# Patient Record
Sex: FEMALE | Race: WHITE | NOT HISPANIC OR LATINO | Employment: OTHER | URBAN - METROPOLITAN AREA
[De-identification: names, ages, dates, MRNs, and addresses within clinical notes are randomized per-mention and may not be internally consistent; named-entity substitution may affect disease eponyms.]

---

## 2021-04-08 DIAGNOSIS — Z23 ENCOUNTER FOR IMMUNIZATION: ICD-10-CM

## 2022-01-24 ENCOUNTER — ANNUAL EXAM (OUTPATIENT)
Dept: OBGYN CLINIC | Facility: CLINIC | Age: 72
End: 2022-01-24
Payer: MEDICARE

## 2022-01-24 VITALS
BODY MASS INDEX: 28.53 KG/M2 | DIASTOLIC BLOOD PRESSURE: 80 MMHG | SYSTOLIC BLOOD PRESSURE: 124 MMHG | WEIGHT: 161 LBS | HEIGHT: 63 IN

## 2022-01-24 DIAGNOSIS — N83.291 OTHER OVARIAN CYST, RIGHT SIDE: ICD-10-CM

## 2022-01-24 DIAGNOSIS — Z12.31 ENCOUNTER FOR SCREENING MAMMOGRAM FOR BREAST CANCER: ICD-10-CM

## 2022-01-24 DIAGNOSIS — Z01.419 ENCOUNTER FOR GYNECOLOGICAL EXAMINATION (GENERAL) (ROUTINE) WITHOUT ABNORMAL FINDINGS: Primary | ICD-10-CM

## 2022-01-24 DIAGNOSIS — R47.89 WORD FINDING PROBLEM: ICD-10-CM

## 2022-01-24 PROCEDURE — 99213 OFFICE O/P EST LOW 20 MIN: CPT | Performed by: OBSTETRICS & GYNECOLOGY

## 2022-01-24 PROCEDURE — G0101 CA SCREEN;PELVIC/BREAST EXAM: HCPCS | Performed by: OBSTETRICS & GYNECOLOGY

## 2022-01-24 RX ORDER — LEVOTHYROXINE SODIUM 0.05 MG/1
TABLET ORAL
COMMUNITY

## 2022-01-24 RX ORDER — ATORVASTATIN CALCIUM 80 MG/1
TABLET, FILM COATED ORAL EVERY 24 HOURS
COMMUNITY

## 2022-01-24 RX ORDER — LISINOPRIL 5 MG/1
TABLET ORAL EVERY 24 HOURS
COMMUNITY

## 2022-01-24 RX ORDER — ASPIRIN 81 MG/1
81 TABLET, CHEWABLE ORAL DAILY
COMMUNITY

## 2022-01-24 RX ORDER — LEVOTHYROXINE SODIUM 0.07 MG/1
TABLET ORAL
COMMUNITY

## 2022-01-24 RX ORDER — ESCITALOPRAM OXALATE 20 MG/1
TABLET ORAL EVERY 24 HOURS
COMMUNITY

## 2022-01-24 RX ORDER — POTASSIUM CHLORIDE 20 MEQ/1
1 TABLET, EXTENDED RELEASE ORAL 2 TIMES DAILY
COMMUNITY

## 2022-01-24 RX ORDER — ALBUTEROL SULFATE 90 UG/1
POWDER, METERED RESPIRATORY (INHALATION)
COMMUNITY

## 2022-01-24 NOTE — ASSESSMENT & PLAN NOTE
Suspect pelvic findings due to loop of bowel with stool, will f/u with u/s results when obtained  Order given

## 2022-01-24 NOTE — PATIENT INSTRUCTIONS
Return to office in one year unless having any problems such as breast changes, bleeding, new persistent pain, new progressive bloating, new problems eating (getting full to quickly) or new constant urinary pressure that does not resolve in one week  Get the pelvic ultrasound to check the right side  Follow up with Dr Lizzie Matthews regarding the memory issue and see if she wants to do any testing

## 2022-01-24 NOTE — ASSESSMENT & PLAN NOTE
Doing well,  passed from multiple cancers  Has good support, lives [place] for [place] of the [place]  Some arthritis and issues with finding works  No breast or gyn complaints  Same issues with chronic diarrhea  Normal breast exam, mammo order given  Pelvic exam with right high loop of bowel  U/s to r/o ovarian issue, order given  Last pap 2014, no further indicated  Mammo order given, due  Dexa due 2024  Colonoscopy 2019, due in 3-5 years for polyp

## 2022-01-24 NOTE — PROGRESS NOTES
Assessment/Plan:    Encounter for gynecological examination (general) (routine) without abnormal findings  Doing well,  passed from multiple cancers  Has good support, lives in [place] for several months of the year  Some arthritis and issues with finding works  No breast or gyn complaints  Same issues with chronic diarrhea  Normal breast exam, mammo order given  Pelvic exam with right high loop of bowel  U/s to r/o ovarian issue, order given  Last pap 2014, no further indicated  Mammo order given, due  Dexa due 2024  Colonoscopy 2019, due in 3-5 years for polyp  Word finding problem  Recommend f/u with PCP to assess  Other ovarian cyst, right side  Suspect pelvic findings due to loop of bowel with stool, will f/u with u/s results when obtained  Order given  Diagnoses and all orders for this visit:    Encounter for gynecological examination (general) (routine) without abnormal findings    Encounter for screening mammogram for breast cancer  -     Mammo screening bilateral w 3d & cad; Future    Other ovarian cyst, right side  -     US pelvis complete w transvaginal; Future    Word finding problem    Other orders  -     atorvastatin (LIPITOR) 80 mg tablet; every 24 hours  -     Cholecalciferol 1 25 MG (26004 UT) capsule; 1 capsule  -     escitalopram (Lexapro) 20 mg tablet; every 24 hours  -     levothyroxine 50 mcg tablet; 1 tablet in the morning on an empty stomach  -     levothyroxine 75 mcg tablet; 1 tablet  -     lisinopril (ZESTRIL) 5 mg tablet; every 24 hours  -     metoprolol tartrate (LOPRESSOR) 25 mg tablet; Every 12 hours  -     potassium chloride (Klor-Con M20) 20 mEq tablet; Take 1 tablet by mouth 2 (two) times a day  -     Albuterol Sulfate (ProAir RespiClick) 177 (90 Base) MCG/ACT AEPB; 1 puff  -     aspirin 81 mg chewable tablet; Chew 81 mg daily  -     Cyanocobalamin (VITAMIN B 12 PO); Take by mouth          Subjective:      Patient ID: Ros Hunt is a 66 y o  female  Here for Medicare biannual breast and pelvic exams  The following portions of the patient's history were reviewed and updated as appropriate:   She  has a past medical history of HLD (hyperlipidemia), Hypertension, Hypothyroidism, Osteopenia, and SOB (shortness of breath)  She   Patient Active Problem List    Diagnosis Date Noted    Encounter for gynecological examination (general) (routine) without abnormal findings 01/24/2022    Word finding problem 01/24/2022    Other ovarian cyst, right side 01/24/2022     She  has a past surgical history that includes Cataract extraction (2013); CAROTID ENDARTARECTOMY (2015); Mammo (historical) (Bilateral, 01/2021); DXA procedure(historical) (01/2021); and Colonoscopy (11/2019)  Her family history includes Breast cancer in her paternal aunt; Breast cancer (age of onset: 48) in her other; Heart disease in her father and mother; Hypertension in her mother; Thyroid cancer (age of onset: 39) in her daughter  She  reports that she has quit smoking  She has never used smokeless tobacco  She reports current alcohol use of about 5 0 - 8 0 standard drinks of alcohol per week  She reports that she does not use drugs    Current Outpatient Medications   Medication Sig Dispense Refill    Albuterol Sulfate (ProAir RespiClick) 723 (90 Base) MCG/ACT AEPB 1 puff      aspirin 81 mg chewable tablet Chew 81 mg daily      atorvastatin (LIPITOR) 80 mg tablet every 24 hours      Cholecalciferol 1 25 MG (85102 UT) capsule 1 capsule      Cyanocobalamin (VITAMIN B 12 PO) Take by mouth      escitalopram (Lexapro) 20 mg tablet every 24 hours      levothyroxine 50 mcg tablet 1 tablet in the morning on an empty stomach      levothyroxine 75 mcg tablet 1 tablet      lisinopril (ZESTRIL) 5 mg tablet every 24 hours      metoprolol tartrate (LOPRESSOR) 25 mg tablet Every 12 hours      potassium chloride (Klor-Con M20) 20 mEq tablet Take 1 tablet by mouth 2 (two) times a day No current facility-administered medications for this visit  She has No Known Allergies       Review of Systems  No PMB, breast, bladder, bowel changes   No new persistent pain, bloating, early satiety or pelvic pressure  +chronic diarrhea  +difficulty finding words  +arthritis      Objective:      /80   Ht 5' 3" (1 6 m)   Wt 73 kg (161 lb)   Breastfeeding No   BMI 28 52 kg/m²          Physical Exam  General appearance: no distress, pleasant  Neck: thyroid without nodules or thyromegaly, no palpable adenopathy  Lymph nodes: no palpable adenopathy  Breasts: no masses, nodes or skin changes  Abdomen: soft, non tender, no palpable masses  Pelvic exam: normal atrophic external genitalia, urethral meatus normal, vagina atrophic without lesions, cervix atrophic without lesions, uterus small, non tender, no adnexal masses, high right loop of bowel with stool unable to displace, non tender  Rectal exam: normal sphincter tone, no masses, RV confirms above

## 2022-01-24 NOTE — LETTER
January 24, 2022     Klaudia Ruiz, [address] [facility] [id number] [facility]    Patient: Heidi Lepe   YOB: 1950   Date of Visit: 1/24/2022       Dear Dr Alexus Garcia: Thank you for referring Nacho Schneider to me for evaluation  Below are my notes for this consultation  If you have questions, please do not hesitate to call me  I look forward to following your patient along with you  Sincerely,        Koki West MD        CC: No Recipients  Koki West MD  1/24/2022 11:48 AM  Sign when Signing Visit  Assessment/Plan:    Encounter for gynecological examination (general) (routine) without abnormal findings  Doing well,  passed from multiple cancers  Has good support, lives [place] for several months of the year  Some arthritis and issues with finding works  No breast or gyn complaints  Same issues with chronic diarrhea  Normal breast exam, mammo order given  Pelvic exam with right high loop of bowel  U/s to r/o ovarian issue, order given  Last pap 2014, no further indicated  Mammo order given, due  Dexa due 2024  Colonoscopy 2019, due in 3-5 years for polyp  Word finding problem  Recommend f/u with PCP to assess  Other ovarian cyst, right side  Suspect pelvic findings due to loop of bowel with stool, will f/u with u/s results when obtained  Order given         Diagnoses and all orders for this visit:    Encounter for gynecological examination (general) (routine) without abnormal findings    Encounter for screening mammogram for breast cancer  -     Mammo screening bilateral w 3d & cad; Future    Other ovarian cyst, right side  -     US pelvis complete w transvaginal; Future    Word finding problem    Other orders  -     atorvastatin (LIPITOR) 80 mg tablet; every 24 hours  -     Cholecalciferol 1 25 MG (99767 UT) capsule; 1 capsule  -     escitalopram (Lexapro) 20 mg tablet; every 24 hours  -     levothyroxine 50 mcg tablet; 1 tablet in the morning on an empty stomach  - levothyroxine 75 mcg tablet; 1 tablet  -     lisinopril (ZESTRIL) 5 mg tablet; every 24 hours  -     metoprolol tartrate (LOPRESSOR) 25 mg tablet; Every 12 hours  -     potassium chloride (Klor-Con M20) 20 mEq tablet; Take 1 tablet by mouth 2 (two) times a day  -     Albuterol Sulfate (ProAir RespiClick) 767 (90 Base) MCG/ACT AEPB; 1 puff  -     aspirin 81 mg chewable tablet; Chew 81 mg daily  -     Cyanocobalamin (VITAMIN B 12 PO); Take by mouth          Subjective:      Patient ID: Miguel Angel Marion is a 66 y o  female  Here for Medicare biannual breast and pelvic exams  The following portions of the patient's history were reviewed and updated as appropriate:   She  has a past medical history of HLD (hyperlipidemia), Hypertension, Hypothyroidism, Osteopenia, and SOB (shortness of breath)  She   Patient Active Problem List    Diagnosis Date Noted    Encounter for gynecological examination (general) (routine) without abnormal findings 01/24/2022    Word finding problem 01/24/2022    Other ovarian cyst, right side 01/24/2022     She  has a past surgical history that includes Cataract extraction (2013); CAROTID ENDARTARECTOMY (2015); Mammo (historical) (Bilateral, 01/2021); DXA procedure(historical) (01/2021); and Colonoscopy (11/2019)  Her family history includes Breast cancer in her paternal aunt; Breast cancer (age of onset: 48) in her other; Heart disease in her father and mother; Hypertension in her mother; Thyroid cancer (age of onset: 39) in her daughter  She  reports that she has quit smoking  She has never used smokeless tobacco  She reports current alcohol use of about 5 0 - 8 0 standard drinks of alcohol per week  She reports that she does not use drugs    Current Outpatient Medications   Medication Sig Dispense Refill    Albuterol Sulfate (ProAir RespiClick) 021 (90 Base) MCG/ACT AEPB 1 puff      aspirin 81 mg chewable tablet Chew 81 mg daily      atorvastatin (LIPITOR) 80 mg tablet every 24 hours      Cholecalciferol 1 25 MG (18031 UT) capsule 1 capsule      Cyanocobalamin (VITAMIN B 12 PO) Take by mouth      escitalopram (Lexapro) 20 mg tablet every 24 hours      levothyroxine 50 mcg tablet 1 tablet in the morning on an empty stomach      levothyroxine 75 mcg tablet 1 tablet      lisinopril (ZESTRIL) 5 mg tablet every 24 hours      metoprolol tartrate (LOPRESSOR) 25 mg tablet Every 12 hours      potassium chloride (Klor-Con M20) 20 mEq tablet Take 1 tablet by mouth 2 (two) times a day       No current facility-administered medications for this visit  She has No Known Allergies       Review of Systems  No PMB, breast, bladder, bowel changes   No new persistent pain, bloating, early satiety or pelvic pressure  +chronic diarrhea  +difficulty finding words  +arthritis      Objective:      /80   Ht 5' 3" (1 6 m)   Wt 73 kg (161 lb)   Breastfeeding No   BMI 28 52 kg/m²          Physical Exam  General appearance: no distress, pleasant  Neck: thyroid without nodules or thyromegaly, no palpable adenopathy  Lymph nodes: no palpable adenopathy  Breasts: no masses, nodes or skin changes  Abdomen: soft, non tender, no palpable masses  Pelvic exam: normal atrophic external genitalia, urethral meatus normal, vagina atrophic without lesions, cervix atrophic without lesions, uterus small, non tender, no adnexal masses, high right loop of bowel with stool unable to displace, non tender  Rectal exam: normal sphincter tone, no masses, RV confirms above

## 2022-01-26 ENCOUNTER — ULTRASOUND (OUTPATIENT)
Dept: OBGYN CLINIC | Facility: CLINIC | Age: 72
End: 2022-01-26
Payer: MEDICARE

## 2022-01-26 DIAGNOSIS — D21.9 FIBROID: ICD-10-CM

## 2022-01-26 DIAGNOSIS — N83.291 OTHER OVARIAN CYST, RIGHT SIDE: ICD-10-CM

## 2022-01-26 PROCEDURE — 76856 US EXAM PELVIC COMPLETE: CPT | Performed by: OBSTETRICS & GYNECOLOGY

## 2022-01-26 PROCEDURE — 76830 TRANSVAGINAL US NON-OB: CPT | Performed by: OBSTETRICS & GYNECOLOGY

## 2022-01-26 NOTE — PROGRESS NOTES
AMB US Pelvic Non OB    Date/Time: 1/26/2022 2:22 PM  Performed by: Brett Prater  Authorized by: Enzo Lombard, MD     Procedure details:     Indications: leiomyoma      Technique:  Transvaginal US, Non-OB  Uterine findings:     Length (cm): 7 5    Height (cm):  4 9    Width (cm):  5 5    Uterine adhesions: not identified      Adnexal mass: not identified      Polyps: not identified      Myomas: identified      Endometrial stripe: identified      Endometrial hyperplasia: not identified      Endometrium thickness (mm):  4  Left ovary findings:     Left ovary:  Visualized    Cysts: not identified      Length (cm): 2 7    Height (cm): 2 1    Width (cm): 3 2  Right ovary findings:     Right ovary:  Visualized    Cysts: not identified      Length (cm): 2 4    Height (cm): 2 1    Width (cm): 2 6  Other findings:     Free pelvic fluid: not identified      Free peritoneal fluid: not identified    Post-Procedure Details:     Impression:  Endo-4mm  RT Fundal calcified Fibroid=35 x 34 x 34 mm    Tolerance:   Tolerated well, no immediate complications

## 2022-02-01 ENCOUNTER — TELEPHONE (OUTPATIENT)
Dept: OBGYN CLINIC | Facility: CLINIC | Age: 72
End: 2022-02-01

## 2022-02-01 PROBLEM — Z86.018 HISTORY OF UTERINE FIBROID: Status: ACTIVE | Noted: 2022-02-01

## 2022-02-01 NOTE — TELEPHONE ENCOUNTER
Spoke with Annette Tobar, informed of ultrasound with known, now smaller, fibroid and normal ovaries

## 2022-02-17 ENCOUNTER — VBI (OUTPATIENT)
Dept: ADMINISTRATIVE | Facility: OTHER | Age: 72
End: 2022-02-17

## 2022-02-17 NOTE — TELEPHONE ENCOUNTER
Upon review of the In Basket request we were able to locate, review, and update the patient chart as requested for DEXA Scan and Mammogram     Any additional questions or concerns should be emailed to the Practice Liaisons via [email or web address] email, please do not reply via In Basket      Thank you  Shelley Doing

## 2023-06-02 ENCOUNTER — TELEPHONE (OUTPATIENT)
Dept: OBGYN CLINIC | Facility: CLINIC | Age: 73
End: 2023-06-02

## 2023-06-02 DIAGNOSIS — Z12.31 ENCOUNTER FOR SCREENING MAMMOGRAM FOR MALIGNANT NEOPLASM OF BREAST: Primary | ICD-10-CM

## 2023-06-02 NOTE — TELEPHONE ENCOUNTER
6/2/23 patient has an upcoming breast check appointment 10/19/23 with Dr Jose Birch  Patient is asking for a updated mammogram and U/S order that pt forgot to do in 2022   Thank you

## 2023-06-02 NOTE — TELEPHONE ENCOUNTER
She just had the mammogram last year (1/2022) which showed the stable cyst    Screening mammo order placed

## 2023-06-05 NOTE — TELEPHONE ENCOUNTER
Pt  Informed of had Mammogram testing done in January 2022,showing stable cyst  Advised Dr Gustavo Lino ordering screening Mammogram order,no breast u/s orders  Yelena verbalized understanding  Will mail mammogram order to her home address

## 2023-08-03 ENCOUNTER — TELEPHONE (OUTPATIENT)
Dept: OBGYN CLINIC | Facility: CLINIC | Age: 73
End: 2023-08-03

## 2023-08-03 NOTE — TELEPHONE ENCOUNTER
Joyce Kirk of Dr. Aamir Estrada recommendations. Cammie Renner wants to see how things progress then decide,about scheduling an appt with Dr. Neela Gibbs.

## 2023-08-03 NOTE — TELEPHONE ENCOUNTER
Stoney Schulz fell today injuring left breast on a statue. She is currently in [place] until October. She was seen at a Urgent Care facility in [place] and prescribed Cephalexin 500 mg. She said the left breast feels hard, lump & warm to touch. Stoney Schulz said the area is the same spot that has been drained in the past. Stoney Schulz is going to try & upload the Urgent Care records. The Urgent Care did a CXR. Stoney Schulz was informed of a contusion of left Thoracic, Mastodyna but no abscess. Stoney Schulz was informed by Urgent Care to contact our office. Stoney Schulz doesn't want to leave [place]. Sent message to Dr. Erum Garcia.

## 2023-08-03 NOTE — TELEPHONE ENCOUNTER
I would have to defer to urgent care and emergent therapy. Until she can be seen for a breast exam, there is little I can do.

## 2023-10-04 PROBLEM — Z80.3 FAMILY HISTORY OF BREAST CANCER: Status: ACTIVE | Noted: 2023-10-04

## 2023-10-04 NOTE — PROGRESS NOTES
Assessment/Plan:    Breast lump on left side at 2 o'clock position  [facility] 8/1/23 and hit left breast. Had pain an tennis ball size mass following, now smaller, . No bruising, skin changes or nipple discharge. On exam left 4 x 4 cm cyst with small firm nodule. H/o "large cyst" on mammogram, last  1/27/22 BIRADS 2A. Diagnostic left mammo and u/s ordered. RTO 2 wks for recheck. Suspect trauma related changes, possible fat necrosis. Diagnoses and all orders for this visit:    Breast lump on left side at 2 o'clock position  -     Mammo diagnostic left w 3d & cad; Future  -     US breast left limited (diagnostic); Future    Family history of breast cancer          Subjective:      Patient ID: Shaylee Hercules is a 74 y.o. female. HPI Here for breast check after fall with mass. The following portions of the patient's history were reviewed and updated as appropriate:   She  has a past medical history of Abnormal ECG (?), Anemia (70s; 2016?), Diabetes mellitus ([address]) (2018?), Fibroid (1973), HLD (hyperlipidemia), Hypertension, Hypothyroidism, Osteopenia, SOB (shortness of breath), and Varicella. She   Patient Active Problem List    Diagnosis Date Noted   • Breast lump on left side at 2 o'clock position 10/05/2023   • Family history of breast cancer 10/04/2023   • History of uterine fibroid 02/01/2022   • Encounter for gynecological examination (general) (routine) without abnormal findings 01/24/2022   • Word finding problem 01/24/2022   • Other ovarian cyst, right side 01/24/2022     She  has a past surgical history that includes Cataract extraction (2013); CAROTID ENDARTARECTOMY (2015); Mammo (historical) (Bilateral, 01/2021); DXA procedure(historical) (01/2021); Colonoscopy (11/2019); and Gynecologic cryosurgery (1977?).   Her family history includes Breast cancer in her paternal aunt; Breast cancer (age of onset: 48) in her other; Cancer in her daughter; Heart attack in her brother, father, and mother; Heart disease in her father and mother; Hypertension in her mother; Stroke in her mother; Thyroid cancer (age of onset: 39) in her daughter; Thyroid disease in her daughter. She  reports that she quit smoking about 19 years ago. Her smoking use included cigarettes. She started smoking about 58 years ago. She has a 17.50 pack-year smoking history. She has never used smokeless tobacco. She reports current alcohol use of about 10.0 - 18.0 standard drinks of alcohol per week. She reports that she does not use drugs. Current Outpatient Medications   Medication Sig Dispense Refill   • aspirin 81 mg chewable tablet Chew 81 mg daily     • atorvastatin (LIPITOR) 80 mg tablet every 24 hours     • Cholecalciferol 1.25 MG (95575 UT) capsule 1 capsule     • Cyanocobalamin (VITAMIN B 12 PO) Take by mouth     • escitalopram (Lexapro) 20 mg tablet every 24 hours     • levothyroxine 50 mcg tablet 1 tablet in the morning on an empty stomach     • levothyroxine 75 mcg tablet 1 tablet     • lisinopril (ZESTRIL) 5 mg tablet every 24 hours     • metoprolol tartrate (LOPRESSOR) 25 mg tablet Every 12 hours     • potassium chloride (Klor-Con M20) 20 mEq tablet Take 1 tablet by mouth 2 (two) times a day       No current facility-administered medications for this visit. She has No Known Allergies. .    Review of Systems  +left breast mass, no nipple discharge or bleeding, no bruising    Objective:      /74 (BP Location: Left arm, Patient Position: Sitting, Cuff Size: Standard)   Ht 5' 3.5" (1.613 m)   Wt 73.8 kg (162 lb 9.6 oz)   BMI 28.35 kg/m²          Physical Exam    Appears well, no apparent distress. Does not appear anxious or depressed. Neck: thyroid normal size without nodules, no palpable adenopathy  Breasts: Left 2:00 4 cm cyst with superficial firm nodule 5 mm. Tender below breast over ribs.  No other masses, nodes, skin changes  Abdomen: soft, non tender, non tender liver edge

## 2023-10-05 ENCOUNTER — OFFICE VISIT (OUTPATIENT)
Dept: OBGYN CLINIC | Facility: CLINIC | Age: 73
End: 2023-10-05
Payer: MEDICARE

## 2023-10-05 VITALS
HEIGHT: 64 IN | WEIGHT: 162.6 LBS | DIASTOLIC BLOOD PRESSURE: 74 MMHG | SYSTOLIC BLOOD PRESSURE: 164 MMHG | BODY MASS INDEX: 27.76 KG/M2

## 2023-10-05 DIAGNOSIS — Z80.3 FAMILY HISTORY OF BREAST CANCER: ICD-10-CM

## 2023-10-05 DIAGNOSIS — N63.21 BREAST LUMP ON LEFT SIDE AT 2 O'CLOCK POSITION: Primary | ICD-10-CM

## 2023-10-05 PROCEDURE — 99213 OFFICE O/P EST LOW 20 MIN: CPT | Performed by: OBSTETRICS & GYNECOLOGY

## 2023-10-05 NOTE — LETTER
October 5, 2023     [facility] [address]  [address] [facility]    Patient: Remigio Harley   YOB: 1950   Date of Visit: 10/5/2023       Dear Dr. Jacqueline Keith:    Orly Charitycristi was in to see me today for evaluation. Below are my notes for this consultation. If you have questions, please do not hesitate to call me. I look forward to following your patient along with you. Sincerely,        Benito Kennedy MD        CC: No Recipients    Benito Kennedy MD  10/5/2023 11:11 AM  Sign when Signing Visit  Assessment/Plan:    Breast lump on left side at 2 o'clock position  [facility] 8/1/23 and hit left breast. Had pain an tennis ball size mass following, now smaller, . No bruising, skin changes or nipple discharge. On exam left 4 x 4 cm cyst with small firm nodule. H/o "large cyst" on mammogram, last  1/27/22 BIRADS 2A. Diagnostic left mammo and u/s ordered. RTO 2 wks for recheck. Suspect trauma related changes, possible fat necrosis. Diagnoses and all orders for this visit:    Breast lump on left side at 2 o'clock position  -     Mammo diagnostic left w 3d & cad; Future  -     US breast left limited (diagnostic); Future    Family history of breast cancer         Subjective:     Patient ID: Remigio Harley is a 74 y.o. female. HPI Here for breast check after fall with mass. The following portions of the patient's history were reviewed and updated as appropriate:   She  has a past medical history of Abnormal ECG (?), Anemia (70s; 2016?), Diabetes mellitus ([address]) (2018?), Fibroid (1973), HLD (hyperlipidemia), Hypertension, Hypothyroidism, Osteopenia, SOB (shortness of breath), and Varicella.   She   Patient Active Problem List    Diagnosis Date Noted   • Breast lump on left side at 2 o'clock position 10/05/2023   • Family history of breast cancer 10/04/2023   • History of uterine fibroid 02/01/2022   • Encounter for gynecological examination (general) (routine) without abnormal findings 01/24/2022   • Word finding problem 01/24/2022   • Other ovarian cyst, right side 01/24/2022     She  has a past surgical history that includes Cataract extraction (2013); CAROTID ENDARTARECTOMY (2015); Mammo (historical) (Bilateral, 01/2021); DXA procedure(historical) (01/2021); Colonoscopy (11/2019); and Gynecologic cryosurgery (1977?). Her family history includes Breast cancer in her paternal aunt; Breast cancer (age of onset: 48) in her other; Cancer in her daughter; Heart attack in her brother, father, and mother; Heart disease in her father and mother; Hypertension in her mother; Stroke in her mother; Thyroid cancer (age of onset: 39) in her daughter; Thyroid disease in her daughter. She  reports that she quit smoking about 19 years ago. Her smoking use included cigarettes. She started smoking about 58 years ago. She has a 17.50 pack-year smoking history. She has never used smokeless tobacco. She reports current alcohol use of about 10.0 - 18.0 standard drinks of alcohol per week. She reports that she does not use drugs. Current Outpatient Medications   Medication Sig Dispense Refill   • aspirin 81 mg chewable tablet Chew 81 mg daily     • atorvastatin (LIPITOR) 80 mg tablet every 24 hours     • Cholecalciferol 1.25 MG (23765 UT) capsule 1 capsule     • Cyanocobalamin (VITAMIN B 12 PO) Take by mouth     • escitalopram (Lexapro) 20 mg tablet every 24 hours     • levothyroxine 50 mcg tablet 1 tablet in the morning on an empty stomach     • levothyroxine 75 mcg tablet 1 tablet     • lisinopril (ZESTRIL) 5 mg tablet every 24 hours     • metoprolol tartrate (LOPRESSOR) 25 mg tablet Every 12 hours     • potassium chloride (Klor-Con M20) 20 mEq tablet Take 1 tablet by mouth 2 (two) times a day       No current facility-administered medications for this visit. She has No Known Allergies. .    Review of Systems +left breast mass, no nipple discharge or bleeding, no bruising    Objective:      /74 (BP Location: Left arm, Patient Position: Sitting, Cuff Size: Standard)   Ht 5' 3.5" (1.613 m)   Wt 73.8 kg (162 lb 9.6 oz)   BMI 28.35 kg/m²         Physical Exam   Appears well, no apparent distress. Does not appear anxious or depressed. Neck: thyroid normal size without nodules, no palpable adenopathy  Breasts: Left 2:00 4 cm cyst with superficial firm nodule 5 mm. Tender below breast over ribs.  No other masses, nodes, skin changes  Abdomen: soft, non tender, non tender liver edge

## 2023-10-05 NOTE — ASSESSMENT & PLAN NOTE
Marquise Bee 8/1/23 and hit left breast. Had pain an tennis ball size mass following, now smaller, . No bruising, skin changes or nipple discharge. On exam left 4 x 4 cm cyst with small firm nodule. H/o "large cyst" on mammogram, last  1/27/22 BIRADS 2A. Diagnostic left mammo and u/s ordered. RTO 2 wks for recheck. Suspect trauma related changes, possible fat necrosis.

## 2023-11-06 ENCOUNTER — TELEPHONE (OUTPATIENT)
Dept: OBGYN CLINIC | Facility: CLINIC | Age: 73
End: 2023-11-06

## 2023-11-06 NOTE — TELEPHONE ENCOUNTER
Patient called into office reporting that she was unable to get her breast biopsy scheduled any earlier than 11/15/23. Her office visit with Dr. Citlali Grodon is on 11/14/23. Patient was advised to keep her appt with Dr. Citlali Gordon. Patient was informed that a breast surgeon will be managing her breast biopsy results and after care and she can discuss follow up care with Dr. Citlali Gordon when she comes in for her annual visit. Patient verbalized understanding and appreciated phone call.

## 2023-11-13 NOTE — PROGRESS NOTES
Assessment/Plan:    Breast lump on left side at 2 o'clock position  Cyst getting smaller, mammo and left u/s 10/25/23 BIRADS 2B; scheduled for radiology biopsy of cyst tomorrow. Exam unchanged with firm nodule on top of 3 cm cyst.  Mammo order given for next year. History of uterine fibroid  Asymptomatic, no change in exam.   Last pap 2014, no further indicated. Diagnoses and all orders for this visit:    Breast lump on left side at 2 o'clock position    Osteopenia of left hip  -     DXA bone density spine hip and pelvis; Future    Other orders  -     gabapentin (NEURONTIN) 300 mg capsule; take 1 capsule by mouth twice a day TAPER UPWARDS AS DIRECTED          Subjective:      Patient ID: Shaylee Hercules is a 74 y.o. female. HPI Here for annual gyn and breast exam.    The following portions of the patient's history were reviewed and updated as appropriate: She  has a past medical history of Abnormal ECG (?), Anemia (70s; 2016?), Diabetes mellitus ([address]) (2018?), Fibroid (1973), HLD (hyperlipidemia), Hypertension, Hypothyroidism, Osteopenia, SOB (shortness of breath), and Varicella. She   Patient Active Problem List    Diagnosis Date Noted    Osteopenia of left hip 11/14/2023    Breast lump on left side at 2 o'clock position 10/05/2023    Family history of breast cancer 10/04/2023    History of uterine fibroid 02/01/2022    Encounter for gynecological examination (general) (routine) without abnormal findings 01/24/2022    Word finding problem 01/24/2022     She  has a past surgical history that includes Cataract extraction (2013); CAROTID ENDARTARECTOMY (2015); Mammo (historical) (Bilateral, 01/2021); DXA procedure(historical) (01/2021); Colonoscopy (11/2019); and Gynecologic cryosurgery (1977?).   Her family history includes Breast cancer in her paternal aunt; Breast cancer (age of onset: 48) in her other; Cancer in her daughter; Heart attack in her brother, father, and mother; Heart disease in her father and mother; Hypertension in her mother; Stroke in her mother; Thyroid cancer (age of onset: 39) in her daughter; Thyroid disease in her daughter. She  reports that she quit smoking about 19 years ago. Her smoking use included cigarettes. She started smoking about 58 years ago. She has a 17.50 pack-year smoking history. She has never used smokeless tobacco. She reports current alcohol use of about 10.0 - 18.0 standard drinks of alcohol per week. She reports that she does not use drugs. Current Outpatient Medications   Medication Sig Dispense Refill    aspirin 81 mg chewable tablet Chew 81 mg daily      atorvastatin (LIPITOR) 80 mg tablet every 24 hours      Cholecalciferol 1.25 MG (76875 UT) capsule 1 capsule      Cyanocobalamin (VITAMIN B 12 PO) Take by mouth      escitalopram (Lexapro) 20 mg tablet every 24 hours      gabapentin (NEURONTIN) 300 mg capsule take 1 capsule by mouth twice a day TAPER UPWARDS AS DIRECTED      levothyroxine 50 mcg tablet 1 tablet in the morning on an empty stomach      levothyroxine 75 mcg tablet 1 tablet      lisinopril (ZESTRIL) 5 mg tablet every 24 hours      metoprolol tartrate (LOPRESSOR) 25 mg tablet Every 12 hours      potassium chloride (Klor-Con M20) 20 mEq tablet Take 1 tablet by mouth 2 (two) times a day       No current facility-administered medications for this visit. She has No Known Allergies. .    Review of Systems  No PMB, breast, bladder, bowel changes.  No new persistent pain, bloating, early satiety or pelvic pressure      Objective:      /60 (BP Location: Left arm, Patient Position: Sitting, Cuff Size: Large)   Ht 5' 3.25" (1.607 m)   Wt 75.8 kg (167 lb)   BMI 29.35 kg/m²          Physical Exam    General appearance: no distress, pleasant  Neck: thyroid without nodules or thyromegaly, no palpable adenopathy  Lymph nodes: no palpable adenopathy  Breasts: Left breast with 3 x 3 cm cyst at 3:00 with small firm nodule on top of cyst. No other masses, nodes or skin changes bilaterally  Abdomen: soft, non tender, no palpable masses  Pelvic exam: normal atrophic external genitalia, urethral meatus normal, vagina atrophic without lesions, cervix atrophic without lesions, uterus small, non tender, no adnexal masses, non tender  Rectal exam: normal sphincter tone, no masses, RV confirms above

## 2023-11-14 ENCOUNTER — OFFICE VISIT (OUTPATIENT)
Dept: OBGYN CLINIC | Facility: CLINIC | Age: 73
End: 2023-11-14

## 2023-11-14 VITALS
DIASTOLIC BLOOD PRESSURE: 60 MMHG | BODY MASS INDEX: 29.59 KG/M2 | SYSTOLIC BLOOD PRESSURE: 136 MMHG | WEIGHT: 167 LBS | HEIGHT: 63 IN

## 2023-11-14 DIAGNOSIS — Z12.31 ENCOUNTER FOR SCREENING MAMMOGRAM FOR MALIGNANT NEOPLASM OF BREAST: ICD-10-CM

## 2023-11-14 DIAGNOSIS — M85.852 OSTEOPENIA OF LEFT HIP: ICD-10-CM

## 2023-11-14 DIAGNOSIS — N63.21 BREAST LUMP ON LEFT SIDE AT 2 O'CLOCK POSITION: Primary | ICD-10-CM

## 2023-11-14 PROBLEM — N83.291 OTHER OVARIAN CYST, RIGHT SIDE: Status: RESOLVED | Noted: 2022-01-24 | Resolved: 2023-11-14

## 2023-11-14 RX ORDER — GABAPENTIN 300 MG/1
CAPSULE ORAL
COMMUNITY
Start: 2023-11-08

## 2023-11-14 NOTE — LETTER
November 14, 2023     [facility] [address] [facility] [address]  [address] [facility]    Patient: Usman De Anda   YOB: 1950   Date of Visit: 11/14/2023       Dear Dr. Mekhi Quevedo:    Flor Terrell was in to see me today for evaluation. Below are my notes for this consultation. If you have questions, please do not hesitate to call me. I look forward to following your patient along with you. Sincerely,        Africa Martin MD        CC: No Recipients    Africa Martin MD  11/14/2023 10:38 AM  Sign when Signing Visit  Assessment/Plan:    Breast lump on left side at 2 o'clock position  Cyst getting smaller, mammo and left u/s 10/25/23 BIRADS 2B; scheduled for radiology biopsy of cyst tomorrow. Exam unchanged with firm nodule on top of 3 cm cyst.  Mammo order given for next year. History of uterine fibroid  Asymptomatic, no change in exam.   Last pap 2014, no further indicated. Diagnoses and all orders for this visit:    Breast lump on left side at 2 o'clock position    Osteopenia of left hip  -     DXA bone density spine hip and pelvis; Future    Other orders  -     gabapentin (NEURONTIN) 300 mg capsule; take 1 capsule by mouth twice a day TAPER UPWARDS AS DIRECTED          Subjective:      Patient ID: Usman De Anda is a 74 y.o. female. HPI Here for annual gyn and breast exam.    The following portions of the patient's history were reviewed and updated as appropriate: She  has a past medical history of Abnormal ECG (?), Anemia (70s; 2016?), Diabetes mellitus ([address]) (2018?), Fibroid (1973), HLD (hyperlipidemia), Hypertension, Hypothyroidism, Osteopenia, SOB (shortness of breath), and Varicella.   She   Patient Active Problem List    Diagnosis Date Noted   • Osteopenia of left hip 11/14/2023   • Breast lump on left side at 2 o'clock position 10/05/2023   • Family history of breast cancer 10/04/2023   • History of uterine fibroid 02/01/2022   • Encounter for gynecological examination (general) (routine) without abnormal findings 01/24/2022   • Word finding problem 01/24/2022     She  has a past surgical history that includes Cataract extraction (2013); CAROTID ENDARTARECTOMY (2015); Mammo (historical) (Bilateral, 01/2021); DXA procedure(historical) (01/2021); Colonoscopy (11/2019); and Gynecologic cryosurgery (1977?). Her family history includes Breast cancer in her paternal aunt; Breast cancer (age of onset: 48) in her other; Cancer in her daughter; Heart attack in her brother, father, and mother; Heart disease in her father and mother; Hypertension in her mother; Stroke in her mother; Thyroid cancer (age of onset: 39) in her daughter; Thyroid disease in her daughter. She  reports that she quit smoking about 19 years ago. Her smoking use included cigarettes. She started smoking about 58 years ago. She has a 17.50 pack-year smoking history. She has never used smokeless tobacco. She reports current alcohol use of about 10.0 - 18.0 standard drinks of alcohol per week. She reports that she does not use drugs. Current Outpatient Medications   Medication Sig Dispense Refill   • aspirin 81 mg chewable tablet Chew 81 mg daily     • atorvastatin (LIPITOR) 80 mg tablet every 24 hours     • Cholecalciferol 1.25 MG (78432 UT) capsule 1 capsule     • Cyanocobalamin (VITAMIN B 12 PO) Take by mouth     • escitalopram (Lexapro) 20 mg tablet every 24 hours     • gabapentin (NEURONTIN) 300 mg capsule take 1 capsule by mouth twice a day TAPER UPWARDS AS DIRECTED     • levothyroxine 50 mcg tablet 1 tablet in the morning on an empty stomach     • levothyroxine 75 mcg tablet 1 tablet     • lisinopril (ZESTRIL) 5 mg tablet every 24 hours     • metoprolol tartrate (LOPRESSOR) 25 mg tablet Every 12 hours     • potassium chloride (Klor-Con M20) 20 mEq tablet Take 1 tablet by mouth 2 (two) times a day       No current facility-administered medications for this visit. She has No Known Allergies. .    Review of Systems  No PMB, breast, bladder, bowel changes.  No new persistent pain, bloating, early satiety or pelvic pressure      Objective:      /60 (BP Location: Left arm, Patient Position: Sitting, Cuff Size: Large)   Ht 5' 3.25" (1.607 m)   Wt 75.8 kg (167 lb)   BMI 29.35 kg/m²          Physical Exam    General appearance: no distress, pleasant  Neck: thyroid without nodules or thyromegaly, no palpable adenopathy  Lymph nodes: no palpable adenopathy  Breasts: Left breast with 3 x 3 cm cyst at 3:00 with small firm nodule on top of cyst. No other masses, nodes or skin changes bilaterally  Abdomen: soft, non tender, no palpable masses  Pelvic exam: normal atrophic external genitalia, urethral meatus normal, vagina atrophic without lesions, cervix atrophic without lesions, uterus small, non tender, no adnexal masses, non tender  Rectal exam: normal sphincter tone, no masses, RV confirms above

## 2023-11-14 NOTE — ASSESSMENT & PLAN NOTE
Cyst getting smaller, mammo and left u/s 10/25/23 BIRADS 2B; scheduled for radiology biopsy of cyst tomorrow. Exam unchanged with firm nodule on top of 3 cm cyst.  Mammo order given for next year.

## 2024-02-21 PROBLEM — Z01.419 ENCOUNTER FOR GYNECOLOGICAL EXAMINATION (GENERAL) (ROUTINE) WITHOUT ABNORMAL FINDINGS: Status: RESOLVED | Noted: 2022-01-24 | Resolved: 2024-02-21

## 2024-04-09 ENCOUNTER — TELEPHONE (OUTPATIENT)
Age: 74
End: 2024-04-09

## 2024-04-09 NOTE — TELEPHONE ENCOUNTER
Please inform pt that imaging orders will be given after visit unless having new changes that need attention prior to her November visit.   Thanks.

## 2024-04-09 NOTE — TELEPHONE ENCOUNTER
Patient called, scheduled Yearly 11/18/24. Pt was seen 11/14 for breast lump. Pt advised they had a breast biopsy at [facility] 11/15 & believe they had mammogram 11/29 but not certain. [facility] mammogram results not transferred in Chart. Pt inquired if they should wait until 11/18 visit for new mammogram order or if requested sooner due to prior breast injury/ lump. Please reach out to the pt if to be performed earlier.

## 2024-11-14 NOTE — PROGRESS NOTES
Assessment/Plan:    Encounter for gynecological examination (general) (routine) without abnormal findings  Here for Medicare biannual breast and pelvic exams.   In process of w/u prior to back surgery for sciatica.   No breast or gyn complaints.    Normal breast and pelvic exams.  Last pap 2014, no further indicated  Mammo order given, last 11/6/24 BIRADS 2B  Colonoscopy 2019, due 3-5 years for polyp; pt to contact Dr Berrios for recommendation      Osteopenia of left hip  Reviewed most recent dexa from 11/11/24 with 5% increase in spine T-0.8; 3% increase in right hip T-1.4; 4% increase in right fem neck T-1.4. Ten year hip fx risk 2.2%  Recommend follow up dexa in 3-5 years    Family history of breast cancer  No change in self breast or clinical breast exams.  Normal imaging reviewed.   RTO one year for breast check       Diagnoses and all orders for this visit:    Encounter for gynecological examination (general) (routine) without abnormal findings    Osteopenia of left hip    Family history of breast cancer    Other orders  -     rosuvastatin (CRESTOR) 40 MG tablet; Take 40 mg by mouth daily at bedtime  -     magnesium hydroxide (MILK OF MAGNESIA) 400 mg/5 mL oral suspension; Take by mouth daily as needed for constipation  -     Alpha Lipoic Acid-Biotin -450 MG-MCG TBCR; Take by mouth  -     albuterol (PROVENTIL HFA,VENTOLIN HFA) 90 mcg/act inhaler; Inhale 2 puffs every 6 (six) hours as needed for wheezing          Subjective:      Patient ID: Sailaja Jeronimo is a 74 y.o. female.    HPI Here for Medicare biannual breast and pelvic exams.     The following portions of the patient's history were reviewed and updated as appropriate: She  has a past medical history of Abnormal ECG (?), Anemia (70s; 2016?), Diabetes mellitus ([facility]) (2018?), Fibroid (1973), HLD (hyperlipidemia), Hypertension, Hypothyroidism, Osteopenia, SOB (shortness of breath), and Varicella.  She   Patient Active Problem List    Diagnosis  Date Noted    Osteopenia of left hip 11/14/2023    Family history of breast cancer 10/04/2023    History of uterine fibroid 02/01/2022    Word finding problem 01/24/2022     She  has a past surgical history that includes Cataract extraction (2013); CAROTID ENDARTARECTOMY (2015); Mammo (historical) (Bilateral, 01/2021); DXA procedure(historical) (01/2021); Colonoscopy (11/2019); Gynecologic cryosurgery (1977?); and Breast biopsy (Left, 11/15/2023).  Her family history includes Breast cancer in her paternal aunt; Breast cancer (age of onset: 50) in an other family member; Heart attack in her brother, father, and mother; Heart disease in her father and mother; Hypertension in her mother; Stroke in her mother; Thyroid cancer (age of onset: 45) in her daughter; Thyroid disease in her daughter.  She  reports that she quit smoking about 20 years ago. Her smoking use included cigarettes. She started smoking about 59 years ago. She has a 19.5 pack-year smoking history. She has never been exposed to tobacco smoke. She has never used smokeless tobacco. She reports current alcohol use of about 10.0 - 18.0 standard drinks of alcohol per week. She reports that she does not use drugs.  Current Outpatient Medications   Medication Sig Dispense Refill    albuterol (PROVENTIL HFA,VENTOLIN HFA) 90 mcg/act inhaler Inhale 2 puffs every 6 (six) hours as needed for wheezing      Alpha Lipoic Acid-Biotin -450 MG-MCG TBCR Take by mouth      aspirin 81 mg chewable tablet Chew 81 mg daily      Cholecalciferol 1.25 MG (19088 UT) capsule 1 capsule      escitalopram (Lexapro) 20 mg tablet every 24 hours      gabapentin (NEURONTIN) 300 mg capsule take 1 capsule by mouth twice a day TAPER UPWARDS AS DIRECTED      levothyroxine 50 mcg tablet 1 tablet in the morning on an empty stomach      levothyroxine 75 mcg tablet 1 tablet      lisinopril (ZESTRIL) 5 mg tablet every 24 hours      magnesium hydroxide (MILK OF MAGNESIA) 400 mg/5 mL oral  "suspension Take by mouth daily as needed for constipation      metoprolol tartrate (LOPRESSOR) 25 mg tablet Every 12 hours      potassium chloride (Klor-Con M20) 20 mEq tablet Take 1 tablet by mouth 2 (two) times a day      rosuvastatin (CRESTOR) 40 MG tablet Take 40 mg by mouth daily at bedtime       No current facility-administered medications for this visit.     She has no known allergies..    Review of Systems  No PMB, breast, bladder, bowel changes. No new persistent pain, bloating, early satiety or pelvic pressure    Objective:    /66 (BP Location: Left arm, Patient Position: Standing, Cuff Size: Standard)   Ht 5' 3.5\" (1.613 m)   Wt 72.1 kg (159 lb)   BMI 27.72 kg/m²      Physical Exam    General appearance: no distress, pleasant  Neck: thyroid without nodules or thyromegaly, no palpable adenopathy  Lymph nodes: no palpable adenopathy  Breasts: no masses, nodes or skin changes  Abdomen: soft, non tender, no palpable masses  Pelvic exam: normal atrophic external genitalia, urethral meatus normal, vagina atrophic without lesions, parous cervix atrophic without lesions, uterus small, non tender, no adnexal masses, non tender  Rectal exam: normal sphincter tone, no masses, RV confirms above    "

## 2024-11-18 ENCOUNTER — ANNUAL EXAM (OUTPATIENT)
Dept: OBGYN CLINIC | Facility: CLINIC | Age: 74
End: 2024-11-18
Payer: MEDICARE

## 2024-11-18 VITALS
SYSTOLIC BLOOD PRESSURE: 110 MMHG | DIASTOLIC BLOOD PRESSURE: 66 MMHG | WEIGHT: 159 LBS | BODY MASS INDEX: 27.14 KG/M2 | HEIGHT: 64 IN

## 2024-11-18 DIAGNOSIS — Z80.3 FAMILY HISTORY OF BREAST CANCER: ICD-10-CM

## 2024-11-18 DIAGNOSIS — Z01.419 ENCOUNTER FOR GYNECOLOGICAL EXAMINATION (GENERAL) (ROUTINE) WITHOUT ABNORMAL FINDINGS: Primary | ICD-10-CM

## 2024-11-18 DIAGNOSIS — M85.852 OSTEOPENIA OF LEFT HIP: ICD-10-CM

## 2024-11-18 PROBLEM — N63.21 BREAST LUMP ON LEFT SIDE AT 2 O'CLOCK POSITION: Status: RESOLVED | Noted: 2023-10-05 | Resolved: 2024-11-18

## 2024-11-18 PROCEDURE — G0101 CA SCREEN;PELVIC/BREAST EXAM: HCPCS | Performed by: OBSTETRICS & GYNECOLOGY

## 2024-11-18 RX ORDER — ALBUTEROL SULFATE 90 UG/1
2 INHALANT RESPIRATORY (INHALATION) EVERY 6 HOURS PRN
COMMUNITY

## 2024-11-18 RX ORDER — ROSUVASTATIN CALCIUM 40 MG/1
40 TABLET, COATED ORAL
COMMUNITY
Start: 2024-11-17

## 2024-11-18 NOTE — ASSESSMENT & PLAN NOTE
No change in self breast or clinical breast exams.  Normal imaging reviewed.   RTO one year for breast check

## 2024-11-18 NOTE — ASSESSMENT & PLAN NOTE
Here for Medicare biannual breast and pelvic exams.   In process of w/u prior to back surgery for sciatica.   No breast or gyn complaints.    Normal breast and pelvic exams.  Last pap 2014, no further indicated  Mammo order given, last 11/6/24 BIRADS 2B  Colonoscopy 2019, due 3-5 years for polyp; pt to contact Dr Berrios for recommendation

## 2024-11-18 NOTE — LETTER
November 18, 2024     Ashley Madrid MD  [address]  [facility] [address]    Patient: Sailaja Jeronimo   YOB: 1950   Date of Visit: 11/18/2024       Dear Dr. Madrid:    Thank you for referring Sailaja Jeronimo to me for evaluation. Below are my notes for this consultation.    If you have questions, please do not hesitate to call me. I look forward to following your patient along with you.         Sincerely,        Leatha Roberson MD        CC: No Recipients    Leatha Roberson MD  11/18/2024  1:56 PM  Sign when Signing Visit  Assessment/Plan:    Encounter for gynecological examination (general) (routine) without abnormal findings  Here for Medicare biannual breast and pelvic exams.   In process of w/u prior to back surgery for sciatica.   No breast or gyn complaints.    Normal breast and pelvic exams.  Last pap 2014, no further indicated  Mammo order given, last 11/6/24 BIRADS 2B  Colonoscopy 2019, due 3-5 years for polyp; pt to contact Dr Berrios for recommendation      Osteopenia of left hip  Reviewed most recent dexa from 11/11/24 with 5% increase in spine T-0.8; 3% increase in right hip T-1.4; 4% increase in right fem neck T-1.4. Ten year hip fx risk 2.2%  Recommend follow up dexa in 3-5 years    Family history of breast cancer  No change in self breast or clinical breast exams.  Normal imaging reviewed.   RTO one year for breast check       Diagnoses and all orders for this visit:    Encounter for gynecological examination (general) (routine) without abnormal findings    Osteopenia of left hip    Family history of breast cancer    Other orders  -     rosuvastatin (CRESTOR) 40 MG tablet; Take 40 mg by mouth daily at bedtime  -     magnesium hydroxide (MILK OF MAGNESIA) 400 mg/5 mL oral suspension; Take by mouth daily as needed for constipation  -     Alpha Lipoic Acid-Biotin -450 MG-MCG TBCR; Take by mouth  -     albuterol (PROVENTIL HFA,VENTOLIN HFA) 90 mcg/act inhaler; Inhale 2 puffs every 6  (six) hours as needed for wheezing          Subjective:      Patient ID: Sailaja Jeronimo is a 74 y.o. female.    HPI Here for Medicare biannual breast and pelvic exams.     The following portions of the patient's history were reviewed and updated as appropriate: She  has a past medical history of Abnormal ECG (?), Anemia (70s; 2016?), Diabetes mellitus (HCC) (2018?), Fibroid (1973), HLD (hyperlipidemia), Hypertension, Hypothyroidism, Osteopenia, SOB (shortness of breath), and Varicella.  She   Patient Active Problem List    Diagnosis Date Noted   • Osteopenia of left hip 11/14/2023   • Family history of breast cancer 10/04/2023   • History of uterine fibroid 02/01/2022   • Word finding problem 01/24/2022     She  has a past surgical history that includes Cataract extraction (2013); CAROTID ENDARTARECTOMY (2015); Mammo (historical) (Bilateral, 01/2021); DXA procedure(historical) (01/2021); Colonoscopy (11/2019); Gynecologic cryosurgery (1977?); and Breast biopsy (Left, 11/15/2023).  Her family history includes Breast cancer in her paternal aunt; Breast cancer (age of onset: 50) in an other family member; Heart attack in her brother, father, and mother; Heart disease in her father and mother; Hypertension in her mother; Stroke in her mother; Thyroid cancer (age of onset: 45) in her daughter; Thyroid disease in her daughter.  She  reports that she quit smoking about 20 years ago. Her smoking use included cigarettes. She started smoking about 59 years ago. She has a 19.5 pack-year smoking history. She has never been exposed to tobacco smoke. She has never used smokeless tobacco. She reports current alcohol use of about 10.0 - 18.0 standard drinks of alcohol per week. She reports that she does not use drugs.  Current Outpatient Medications   Medication Sig Dispense Refill   • albuterol (PROVENTIL HFA,VENTOLIN HFA) 90 mcg/act inhaler Inhale 2 puffs every 6 (six) hours as needed for wheezing     • Alpha Lipoic  "Acid-Biotin -450 MG-MCG TBCR Take by mouth     • aspirin 81 mg chewable tablet Chew 81 mg daily     • Cholecalciferol 1.25 MG (71026 UT) capsule 1 capsule     • escitalopram (Lexapro) 20 mg tablet every 24 hours     • gabapentin (NEURONTIN) 300 mg capsule take 1 capsule by mouth twice a day TAPER UPWARDS AS DIRECTED     • levothyroxine 50 mcg tablet 1 tablet in the morning on an empty stomach     • levothyroxine 75 mcg tablet 1 tablet     • lisinopril (ZESTRIL) 5 mg tablet every 24 hours     • magnesium hydroxide (MILK OF MAGNESIA) 400 mg/5 mL oral suspension Take by mouth daily as needed for constipation     • metoprolol tartrate (LOPRESSOR) 25 mg tablet Every 12 hours     • potassium chloride (Klor-Con M20) 20 mEq tablet Take 1 tablet by mouth 2 (two) times a day     • rosuvastatin (CRESTOR) 40 MG tablet Take 40 mg by mouth daily at bedtime       No current facility-administered medications for this visit.     She has no known allergies..    Review of Systems  No PMB, breast, bladder, bowel changes. No new persistent pain, bloating, early satiety or pelvic pressure    Objective:    /66 (BP Location: Left arm, Patient Position: Standing, Cuff Size: Standard)   Ht 5' 3.5\" (1.613 m)   Wt 72.1 kg (159 lb)   BMI 27.72 kg/m²      Physical Exam    General appearance: no distress, pleasant  Neck: thyroid without nodules or thyromegaly, no palpable adenopathy  Lymph nodes: no palpable adenopathy  Breasts: no masses, nodes or skin changes  Abdomen: soft, non tender, no palpable masses  Pelvic exam: normal atrophic external genitalia, urethral meatus normal, vagina atrophic without lesions, parous cervix atrophic without lesions, uterus small, non tender, no adnexal masses, non tender  Rectal exam: normal sphincter tone, no masses, RV confirms above    "

## 2024-11-18 NOTE — ASSESSMENT & PLAN NOTE
Reviewed most recent dexa from 11/11/24 with 5% increase in spine T-0.8; 3% increase in right hip T-1.4; 4% increase in right fem neck T-1.4. Ten year hip fx risk 2.2%  Recommend follow up dexa in 3-5 years

## 2024-11-18 NOTE — PATIENT INSTRUCTIONS
Return to office in one year unless having any problems such as breast changes, bleeding, new persistent pain, new progressive bloating, new problems eating (getting full to quickly) or new constant urinary pressure that does not resolve in one week.    Call Dr Berrios' office for recommendations in timing of colonoscopy. [phone]

## 2025-07-30 DIAGNOSIS — I10 PRIMARY HYPERTENSION: Primary | ICD-10-CM

## 2025-08-01 RX ORDER — LISINOPRIL 5 MG/1
5 TABLET ORAL EVERY 24 HOURS
Qty: 90 TABLET | Refills: 0 | Status: SHIPPED | OUTPATIENT
Start: 2025-08-01